# Patient Record
Sex: MALE | Race: BLACK OR AFRICAN AMERICAN
[De-identification: names, ages, dates, MRNs, and addresses within clinical notes are randomized per-mention and may not be internally consistent; named-entity substitution may affect disease eponyms.]

---

## 2019-03-26 ENCOUNTER — HOSPITAL ENCOUNTER (OUTPATIENT)
Dept: HOSPITAL 61 - US | Age: 64
Discharge: HOME | End: 2019-03-26
Attending: INTERNAL MEDICINE
Payer: COMMERCIAL

## 2019-03-26 DIAGNOSIS — R22.41: ICD-10-CM

## 2019-03-26 DIAGNOSIS — M79.661: Primary | ICD-10-CM

## 2019-03-26 PROCEDURE — 93971 EXTREMITY STUDY: CPT

## 2019-03-26 NOTE — RAD
EXAM: Right lower extremity venous Doppler sonogram.

 

HISTORY: Pain and swelling.

 

TECHNIQUE: Gray scale and color Doppler sonographic evaluation of the 

right lower extremity veins with spectral waveform analysis was performed.

 

FINDINGS: There is normal color flow, normal compressibility and there are

normal spectral waveforms in the common femoral, superficial femoral, 

popliteal, posterior tibial and greater saphenous veins.

 

IMPRESSION: No Doppler evidence of lower extremity deep venous thrombosis.

 

Electronically signed by: Carmen Bueno MD (3/26/2019 3:51 PM) [place] [id number]